# Patient Record
Sex: MALE | Race: BLACK OR AFRICAN AMERICAN | NOT HISPANIC OR LATINO | Employment: STUDENT | URBAN - METROPOLITAN AREA
[De-identification: names, ages, dates, MRNs, and addresses within clinical notes are randomized per-mention and may not be internally consistent; named-entity substitution may affect disease eponyms.]

---
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
## 2025-01-29 NOTE — PROGRESS NOTES
ENT Consultation    Kade Rondon who is a 19 year old male seen in consultation at the request of Karlos Ochoa MD .      History of Present Illness - Kade Rondon is a 19 year old male patient  Presents for evaluation of right ear pain.  This mostly pressure occasionally sharp pain.  Also feeling of fullness within the entire right side of the head without true hemicrania.  No dizziness no vertigo but different tinnitus off-and-on symptoms loud on the right side.  When he sleeps on the right side he feels worse.  There may have been some dental issues but nothing currently.  No vision changes no photophobia but gets some phonophobia to loud sounds.  Mother has had history of migraines.  Symptoms his burning pain involving his face.    BP Readings from Last 1 Encounters:   09/17/24 132/88       BP noted to be well controlled today in office.     Kade IS NOT a smoker/uses chewing tobacco.        Past Medical History - No past medical history on file.    Current Medications -   Current Outpatient Medications:     acetaminophen (TYLENOL) 500 MG tablet, Take 1-2 tablets (500-1,000 mg) by mouth every 6 hours as needed for mild pain, Disp: 100 tablet, Rfl: 0    ibuprofen (ADVIL/MOTRIN) 100 MG/5ML suspension, Take 30 mLs (600 mg) by mouth every 6 hours as needed for pain or fever, Disp: 473 mL, Rfl: 0    Allergies - No Known Allergies    Social History -   Social History     Socioeconomic History    Marital status: Single     Social Drivers of Health     Financial Resource Strain: Not on File (10/21/2021)    Received from [facility]    Financial Resource Strain     Financial Resource Strain: 0   Transportation Needs: Not on File (10/21/2021)    Received from [facility]    Transportation Needs     Transportation: 0   Physical Activity: Not on File (10/21/2021)    Received from [facility]    Physical Activity     Physical Activity: 0   Stress: Not on File (10/21/2021)    Received from [facility]    Stress     Stress: 0   Social  Connections: Not on File (10/21/2021)    Received from [facility]     Social Connections and Isolation: 0   Housing Stability: Not on File (10/21/2021)    Received from [facility]    Housing Stability     Housin       Family History - No family history on file.    Review of Systems - As per HPI and PMHx, otherwise review of system review of the head and neck negative. Otherwise 10+ review of system is negative    Physical Exam  There were no vitals taken for this visit.  BMI: There is no height or weight on file to calculate BMI.    General - The patient is well nourished and well developed, and appears to have good nutritional status.  Alert and oriented to person and place, answers questions and cooperates with examination appropriately.    SKIN - No suspicious lesions or rashes.  Respiration - No respiratory distress.  Head and Face - Normocephalic and atraumatic, with no gross asymmetry noted of the contour of the facial features.  The facial nerve is intact, with strong symmetric movements.    Voice and Breathing - The patient was breathing comfortably without the use of accessory muscles. The patients voice was clear and strong, and had appropriate pitch and quality.    Ears - Bilateral pinna and EACs with normal appearing overlying skin. Tympanic membrane intact with good mobility on pneumatic otoscopy bilaterally. Bony landmarks of the ossicular chain are normal. The tympanic membranes are normal in appearance. No retraction, perforation, or masses.  No fluid or purulence was seen in the external canal or the middle ear.     Eyes - Extraocular movements intact.  Sclera were not icteric or injected, conjunctiva were pink and moist.    Mouth - Examination of the oral cavity showed pink, healthy oral mucosa. No lesions or ulcerations noted.  The tongue was mobile and midline, and the dentition were in good condition.      Throat - The walls of the oropharynx were smooth, pink, moist, symmetric,  and had no lesions or ulcerations.  The tonsillar pillars and soft palate were symmetric.  The uvula was midline on elevation.    Neck - Normal midline excursion of the laryngotracheal complex during swallowing.  Full range of motion on passive movement.  Palpation of the occipital, submental, submandibular, internal jugular chain, and supraclavicular nodes did not demonstrate any abnormal lymph nodes or masses.  The carotid pulse was palpable bilaterally.  Palpation of the thyroid was soft and smooth, with no nodules or goiter appreciated.  The trachea was mobile and midline.  There is definitely tenderness on palpation around the TMJ on the right.  Intraorally there is also tenderness on palpation of the right temporomandibular space.  Nose - External contour is symmetric, no gross deflection or scars.  Nasal mucosa is pink and moist with no abnormal mucus.  The septum was midline and non-obstructive, turbinates of normal size and position.  No polyps, masses, or purulence noted on examination.    Neuro - Nonfocal neuro exam is normal, CN 2 through 12 intact, normal gait and muscle tone.      Performed in clinic today:  Audiologic Studies - An audiogram and tympanogram were performed today as part of the evaluation and personally reviewed. The tympanogram shows normal Type A curves, with normal canal volumes and middle ear pressures.  There is no sign of eustachian tube dysfunction or middle ear effusion.  The audiogram was also normal.  The sensorineural hearing was age-appropriate, with no evidence of conductive hearing loss or significant asymmetry.      A/P - Kade Rondon is a 19 year old male with otalgia likely related mostly to TMJ disorder.  However because of this hemicrania type symptoms phonophobia which may be migraine related would like to get further evaluation with MRI of the brain with and without contrast.  This will be ordered.  Will also refer patient to [place] [facility] for  further evaluation.  In the meantime local heat ibuprofen is advised.      Reymundo Ortega MD

## 2025-02-13 ENCOUNTER — OFFICE VISIT (OUTPATIENT)
Dept: AUDIOLOGY | Facility: CLINIC | Age: 20
End: 2025-02-13
Payer: COMMERCIAL

## 2025-02-13 ENCOUNTER — OFFICE VISIT (OUTPATIENT)
Dept: OTOLARYNGOLOGY | Facility: CLINIC | Age: 20
End: 2025-02-13
Payer: COMMERCIAL

## 2025-02-13 VITALS
RESPIRATION RATE: 16 BRPM | OXYGEN SATURATION: 99 % | WEIGHT: 167 LBS | BODY MASS INDEX: 23.38 KG/M2 | DIASTOLIC BLOOD PRESSURE: 81 MMHG | SYSTOLIC BLOOD PRESSURE: 135 MMHG | HEIGHT: 71 IN

## 2025-02-13 DIAGNOSIS — G43.909 HEMICRANIA: ICD-10-CM

## 2025-02-13 DIAGNOSIS — M26.609 TMJ (TEMPOROMANDIBULAR JOINT SYNDROME): Primary | ICD-10-CM

## 2025-02-13 DIAGNOSIS — H92.01 OTALGIA, RIGHT: ICD-10-CM

## 2025-02-13 DIAGNOSIS — H93.291 ABNORMAL AUDITORY PERCEPTION OF RIGHT EAR: Primary | ICD-10-CM

## 2025-02-13 NOTE — LETTER
2/13/2025      Kade Rondon  [address]   [place] [facility] [address]      Dear Colleague,    Thank you for referring your patient, Kade Rondon, to the [facility]. Please see a copy of my visit note below.    ENT Consultation    Kade Rondon who is a 19 year old male seen in consultation at the request of Karlos Ochoa MD .      History of Present Illness - Kade Rondon is a 19 year old male patient  Presents for evaluation of right ear pain.  This mostly pressure occasionally sharp pain.  Also feeling of fullness within the entire right side of the head without true hemicrania.  No dizziness no vertigo but different tinnitus off-and-on symptoms loud on the right side.  When he sleeps on the right side he feels worse.  There may have been some dental issues but nothing currently.  No vision changes no photophobia but gets some phonophobia to loud sounds.  Mother has had history of migraines.  Symptoms his burning pain involving his face.    BP Readings from Last 1 Encounters:   09/17/24 132/88       BP noted to be well controlled today in office.     Kade IS NOT a smoker/uses chewing tobacco.        Past Medical History - No past medical history on file.    Current Medications -   Current Outpatient Medications:      acetaminophen (TYLENOL) 500 MG tablet, Take 1-2 tablets (500-1,000 mg) by mouth every 6 hours as needed for mild pain, Disp: 100 tablet, Rfl: 0     ibuprofen (ADVIL/MOTRIN) 100 MG/5ML suspension, Take 30 mLs (600 mg) by mouth every 6 hours as needed for pain or fever, Disp: 473 mL, Rfl: 0    Allergies - No Known Allergies    Social History -   Social History     Socioeconomic History     Marital status: Single     Social Drivers of Health     Financial Resource Strain: Not on File (10/21/2021)    Received from [employer]    Financial Resource Strain      Financial Resource Strain: 0   Transportation Needs: Not on File (10/21/2021)    Received from  [facility]    Transportation Needs      Transportation: 0   Physical Activity: Not on File (10/21/2021)    Received from [employer]    Physical Activity      Physical Activity: 0   Stress: Not on File (10/21/2021)    Received from [facility]    Stress      Stress: 0   Social Connections: Not on File (10/21/2021)    Received from [employer]    Social Connections      Social Connections and Isolation: 0   Housing Stability: Not on File (10/21/2021)    Received from [employer]    Housing Stability      Housin       Family History - No family history on file.    Review of Systems - As per HPI and PMHx, otherwise review of system review of the head and neck negative. Otherwise 10+ review of system is negative    Physical Exam  There were no vitals taken for this visit.  BMI: There is no height or weight on file to calculate BMI.    General - The patient is well nourished and well developed, and appears to have good nutritional status.  Alert and oriented to person and place, answers questions and cooperates with examination appropriately.    SKIN - No suspicious lesions or rashes.  Respiration - No respiratory distress.  Head and Face - Normocephalic and atraumatic, with no gross asymmetry noted of the contour of the facial features.  The facial nerve is intact, with strong symmetric movements.    Voice and Breathing - The patient was breathing comfortably without the use of accessory muscles. The patients voice was clear and strong, and had appropriate pitch and quality.    Ears - Bilateral pinna and EACs with normal appearing overlying skin. Tympanic membrane intact with good mobility on pneumatic otoscopy bilaterally. Bony landmarks of the ossicular chain are normal. The tympanic membranes are normal in appearance. No retraction, perforation, or masses.  No fluid or purulence was seen in the external canal or the middle ear.     Eyes - Extraocular movements intact.  Sclera were not icteric or injected, conjunctiva were pink and  moist.    Mouth - Examination of the oral cavity showed pink, healthy oral mucosa. No lesions or ulcerations noted.  The tongue was mobile and midline, and the dentition were in good condition.      Throat - The walls of the oropharynx were smooth, pink, moist, symmetric, and had no lesions or ulcerations.  The tonsillar pillars and soft palate were symmetric.  The uvula was midline on elevation.    Neck - Normal midline excursion of the laryngotracheal complex during swallowing.  Full range of motion on passive movement.  Palpation of the occipital, submental, submandibular, internal jugular chain, and supraclavicular nodes did not demonstrate any abnormal lymph nodes or masses.  The carotid pulse was palpable bilaterally.  Palpation of the thyroid was soft and smooth, with no nodules or goiter appreciated.  The trachea was mobile and midline.  There is definitely tenderness on palpation around the TMJ on the right.  Intraorally there is also tenderness on palpation of the right temporomandibular space.  Nose - External contour is symmetric, no gross deflection or scars.  Nasal mucosa is pink and moist with no abnormal mucus.  The septum was midline and non-obstructive, turbinates of normal size and position.  No polyps, masses, or purulence noted on examination.    Neuro - Nonfocal neuro exam is normal, CN 2 through 12 intact, normal gait and muscle tone.      Performed in clinic today:  Audiologic Studies - An audiogram and tympanogram were performed today as part of the evaluation and personally reviewed. The tympanogram shows normal Type A curves, with normal canal volumes and middle ear pressures.  There is no sign of eustachian tube dysfunction or middle ear effusion.  The audiogram was also normal.  The sensorineural hearing was age-appropriate, with no evidence of conductive hearing loss or significant asymmetry.      A/P - Kade Rondon is a 19 year old male with otalgia likely related mostly to TMJ  disorder.  However because of this hemicrania type symptoms phonophobia which may be migraine related would like to get further evaluation with MRI of the brain with and without contrast.  This will be ordered.  Will also refer patient to [place] [facility] for further evaluation.  In the meantime local heat ibuprofen is advised.      Reymundo Ortega MD      Again, thank you for allowing me to participate in the care of your patient.        Sincerely,        Reymundo Ortega MD, MD    Electronically signed

## 2025-02-13 NOTE — PROGRESS NOTES
AUDIOLOGY REPORT:    Patient was referred to [facility] from ENT by Dr. Ortega for a hearing examination. They were accompanied today by their mother.  Today they report intermittent ear pain in the right ear.  He denies any difficulty with his hearing.     Testing:    Otoscopy:   Otoscopic exam indicates ears are clear of cerumen bilaterally     Tympanograms:    RIGHT: normal eardrum mobility     LEFT:   normal eardrum mobility    Reflexes (reported by stimulus ear): 1000 Hz  RIGHT: Ipsilateral is present at normal levels  RIGHT: Contralateral is present at normal levels  LEFT:   Ipsilateral is present at normal levels  LEFT:   Contralateral is present at normal levels    Thresholds:   Pure Tone Thresholds assessed using conventional audiometry with good  reliability from 250-8000 Hz bilaterally using insert earphones and circumaural headphones     RIGHT:  normal     LEFT:    normal    Speech Reception Threshold:    RIGHT: 5 dB HL    LEFT:   5 dB HL  Speech Reception Thresholds are in good agreement with pure tone thresholds    Word Recognition Score:     RIGHT: 100% at 45 dB HL using NU-6 recorded word list.    LEFT:   100% at 45 dB HL using NU-6 recorded word list.    Discussed results with the patient.     Patient was returned to ENT for follow up.     Nima Vogel.   Doctor of Audiology  License #[id number]

## 2025-03-03 ENCOUNTER — TELEPHONE (OUTPATIENT)
Dept: OTOLARYNGOLOGY | Facility: CLINIC | Age: 20
End: 2025-03-03
Payer: COMMERCIAL